# Patient Record
Sex: FEMALE | Race: WHITE | NOT HISPANIC OR LATINO | Employment: OTHER | URBAN - METROPOLITAN AREA
[De-identification: names, ages, dates, MRNs, and addresses within clinical notes are randomized per-mention and may not be internally consistent; named-entity substitution may affect disease eponyms.]

---

## 2017-03-16 ENCOUNTER — TRANSFERRED RECORDS (OUTPATIENT)
Dept: HEALTH INFORMATION MANAGEMENT | Facility: CLINIC | Age: 62
End: 2017-03-16

## 2018-11-30 ENCOUNTER — TRANSFERRED RECORDS (OUTPATIENT)
Dept: HEALTH INFORMATION MANAGEMENT | Facility: CLINIC | Age: 63
End: 2018-11-30

## 2021-04-28 ENCOUNTER — TRANSFERRED RECORDS (OUTPATIENT)
Dept: HEALTH INFORMATION MANAGEMENT | Facility: CLINIC | Age: 66
End: 2021-04-28

## 2021-05-10 ENCOUNTER — TRANSFERRED RECORDS (OUTPATIENT)
Dept: HEALTH INFORMATION MANAGEMENT | Facility: CLINIC | Age: 66
End: 2021-05-10

## 2021-05-11 ENCOUNTER — TRANSFERRED RECORDS (OUTPATIENT)
Dept: HEALTH INFORMATION MANAGEMENT | Facility: CLINIC | Age: 66
End: 2021-05-11

## 2021-05-18 ENCOUNTER — TRANSCRIBE ORDERS (OUTPATIENT)
Dept: OTHER | Age: 66
End: 2021-05-18

## 2021-05-18 ENCOUNTER — PRE VISIT (OUTPATIENT)
Dept: ONCOLOGY | Facility: CLINIC | Age: 66
End: 2021-05-18

## 2021-05-18 DIAGNOSIS — C57.4: Primary | ICD-10-CM

## 2021-05-18 NOTE — TELEPHONE ENCOUNTER
Action    Action Taken 21:    -Spoke w/ Jackie @ [facility], pt needs NINO.    -LVN for pt re: NINO; Recs Call.    -21  Records from [facility] received & sent to HIM for stat upload.  2:49 PM         RECORDS STATUS - ALL OTHER DIAGNOSIS      RECORDS RECEIVED FROM: [facility]   DATE RECEIVED:    NOTES STATUS DETAILS   OFFICE NOTE from referring provider     OFFICE NOTE from medical oncologist Received & sent to HIM for upload  [facility]     DISCHARGE SUMMARY from hospital     DISCHARGE REPORT from the ER     OPERATIVE REPORT     MEDICATION LIST     CLINICAL TRIAL TREATMENTS TO DATE     LABS     PATHOLOGY REPORTS [place], Report in CE, Slides requested per IB from [facility] 21: [id number]  3/16/17: [id number]  16: [id number]   ANYTHING RELATED TO DIAGNOSIS     GENONOMIC TESTING     TYPE:     IMAGING (NEED IMAGES & REPORT)     XR PACS [facility]   CT SCANS PACS [facility]   MRI     MAMMO     ULTRASOUND PACS [facility]   PET PACS [facility]

## 2021-05-24 ENCOUNTER — TRANSFERRED RECORDS (OUTPATIENT)
Dept: HEALTH INFORMATION MANAGEMENT | Facility: CLINIC | Age: 66
End: 2021-05-24

## 2021-05-27 ENCOUNTER — ONCOLOGY VISIT (OUTPATIENT)
Dept: ONCOLOGY | Facility: CLINIC | Age: 66
End: 2021-05-27
Attending: OBSTETRICS & GYNECOLOGY
Payer: COMMERCIAL

## 2021-05-27 VITALS
HEIGHT: 64 IN | WEIGHT: 244 LBS | TEMPERATURE: 98.8 F | BODY MASS INDEX: 41.66 KG/M2 | DIASTOLIC BLOOD PRESSURE: 81 MMHG | SYSTOLIC BLOOD PRESSURE: 170 MMHG | HEART RATE: 106 BPM | OXYGEN SATURATION: 93 %

## 2021-05-27 DIAGNOSIS — C57.4: ICD-10-CM

## 2021-05-27 PROCEDURE — G0463 HOSPITAL OUTPT CLINIC VISIT: HCPCS

## 2021-05-27 PROCEDURE — 99204 OFFICE O/P NEW MOD 45 MIN: CPT | Performed by: OBSTETRICS & GYNECOLOGY

## 2021-05-27 RX ORDER — CITALOPRAM HYDROBROMIDE 10 MG/1
10 TABLET ORAL EVERY MORNING
COMMUNITY
Start: 2021-05-21

## 2021-05-27 RX ORDER — LIDOCAINE/PRILOCAINE 2.5 %-2.5%
CREAM (GRAM) TOPICAL
COMMUNITY
Start: 2021-04-16

## 2021-05-27 RX ORDER — ERGOCALCIFEROL 1.25 MG/1
50000 CAPSULE ORAL
COMMUNITY

## 2021-05-27 RX ORDER — ALBUTEROL SULFATE 90 UG/1
2 AEROSOL, METERED RESPIRATORY (INHALATION)
COMMUNITY
Start: 2019-11-13

## 2021-05-27 RX ORDER — MULTIVITAMIN,THERAPEUTIC
1 TABLET ORAL
COMMUNITY

## 2021-05-27 RX ORDER — FLUOCINONIDE 0.5 MG/G
CREAM TOPICAL
COMMUNITY
Start: 2019-09-25

## 2021-05-27 RX ORDER — NYSTATIN 100000 U/G
CREAM TOPICAL
COMMUNITY
Start: 2021-04-13

## 2021-05-27 RX ORDER — DOCUSATE SODIUM 100 MG/1
300 CAPSULE, LIQUID FILLED ORAL
COMMUNITY

## 2021-05-27 SDOH — HEALTH STABILITY: MENTAL HEALTH: HOW MANY STANDARD DRINKS CONTAINING ALCOHOL DO YOU HAVE ON A TYPICAL DAY?: NOT ASKED

## 2021-05-27 SDOH — HEALTH STABILITY: MENTAL HEALTH: HOW OFTEN DO YOU HAVE A DRINK CONTAINING ALCOHOL?: NOT ASKED

## 2021-05-27 SDOH — HEALTH STABILITY: MENTAL HEALTH: HOW OFTEN DO YOU HAVE 6 OR MORE DRINKS ON ONE OCCASION?: NOT ASKED

## 2021-05-27 ASSESSMENT — MIFFLIN-ST. JEOR: SCORE: 1633.84

## 2021-05-27 ASSESSMENT — PAIN SCALES - GENERAL: PAINLEVEL: NO PAIN (0)

## 2021-05-27 NOTE — PROGRESS NOTES
Consult Notes on Referred Patient    Date: 2021       Dr. Marleen Young MD  No address on file       RE: Nidia Spann  : 1955  ED: 2021    Dear Dr. Referred Self:    I had the pleasure of seeing your patient Nidia Spann here at the Gynecologic Cancer Clinic at the [facility] on 2021.  As you know she is a very pleasant 66 year old woman with a recent diagnosis of  Metastatic endometrial cancer.  Given these findings she was subsequently sent to the Gynecologic Cancer Clinic for new patient consultation.     Today: Vomiting on and off, since pelvic RT has either had constipation or diarrhea. Has to be careful with what she eats. Lost 32 lbs eating vegan since 21. Has not been moving around as much as previously. Feels deconditioned. No neuropathy.    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    2016-17: 6 cycles dose dense carbo/taxol    3/16/17: XL/MICHELE/PALMER/LSO/omentectomy and abdominal wound revision  Grade 2 endometrioid endometrial cancer 6.5 cm tumor with in the uterus extending into the cervix and DOI 89%    Adjuvant WPRT and vaginal brachy 6/15/17    4/3/21: vomiting episodes began.    21: N/V 2 cm annular thickening along tranverse colon, no obsturction. LLL mass 5.7 x 5.6 x 2.5 cm. 1 cm hilar LN.    Lung Mass biopsied recurrent.    21: CT chest: .  Left lower lobe mass abutting the pleural surface medially. Small node inferior left hilum suspicious for metastatic disease.    2.  Consider metastatic disease from previous endometrial cancer versus primary bronchogenic malignancy.    21: CTPET scan-confirms metastatic  disease.      Estrogen receptor: Positive (%, strong staining)   Progesterone receptor: Positive (%, moderate staining)     Test(s) Performed:            HER2 by Immunohistochemistry:    Negative (Score 0)     LUNG, LEFT LOWER LOBE, CT-GUIDED NEEDLE BIOPSY:   1. Positive for adenocarcinoma compatible with endometrial origin   2. Hormone receptors:             Estrogen receptor: Positive (94%, strong staining)             Progesterone receptor: Positive (73%, strong staining)   3. See microscopic description and comment [facility]   Comment  The tumor is morphologically and immunohistochemically compatible with the patient's prior endometrioid adenocarcinoma of the endometrium. MSI studies were performed on the initial tumor resection and showed:     1. 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 MLH1 promoter hypermethylated         Review of Systems:    I have reviewed the pertinent positive findings in the review of symptoms with the patient.      Past Medical History:    No DM, heart disease or lung dz.    Past Surgical History:    See above.    Health Maintenance:  Health Maintenance Due   Topic Date Due     PREVENTIVE CARE VISIT  Never done     ADVANCE CARE PLANNING  Never done     MAMMO SCREENING  Never done     COLORECTAL CANCER SCREENING  Never done     HIV SCREENING  Never done     HEPATITIS C SCREENING  Never done     PAP  Never done     DTAP/TDAP/TD IMMUNIZATION (1 - Tdap) Never done     LIPID  Never done     PHQ-2  Never done     ZOSTER IMMUNIZATION (2 of 2) 05/31/2021          Last Mammogram: 5/24/21              Result: indeterminate needs further imaging.   Last Colonoscopy: Within 2 years             Result:                             Current Medications:     has a current medication list which includes the following prescription(s): albuterol, ascorbic acid, citalopram, diphenhydramine, docusate sodium, ergocalciferol,  "fluocinonide, lidocaine-prilocaine, oncovite, nystatin, and vitamin b-12.       Allergies:     Allergies   Allergen Reactions     Dog Epithelium Shortness Of Breath     Paclitaxel Other (See Comments)     Patient experienced loss of conciseness  Patient experienced loss of conciseness              Social History:     Social History     Tobacco Use     Smoking status: Not on file   Substance Use Topics     Alcohol use: Not on file       History   Drug Use Not on file           Family History:     The patient's family history is notable for the following    PGM-breast cancer  Milagro Gherigs disease mom age 83  Osteosarcoma in nephew-maternal side.  No uterine or colon or ovarian cancer.    Physical Exam:     BP (!) 170/81   Pulse 106   Temp 98.8  F (37.1  C) (Oral)   Ht 1.613 m (5' 3.5\")   Wt 110.7 kg (244 lb)   SpO2 93%   BMI 42.54 kg/m    Body mass index is 42.54 kg/m .    General Appearance: healthy and alert, no distress     HEENT:  no thyromegaly, no palpable nodules or masses        Cardiovascular: regular rate and rhythm, no gallops, rubs or murmurs     Respiratory: lungs clear, no rales, rhonchi or wheezes, normal diaphragmatic excursion    Musculoskeletal: extremities non tender and without edema    Skin: no lesions or rashes     Neurological: normal gait, no gross defects     Psychiatric: appropriate mood and affect                               Hematological: normal cervical, supraclavicular and inguinal lymph nodes     Gastrointestinal:       abdomen soft, non-tender, non-distended, no organomegaly or masses    Genitourinary: deferred      Assessment:    Nidia Spann is a 65 year old woman with a new diagnosis of recurrent endometrial cancer. Following with Dr. Heck.      Plan:     1.)    Discussed T/C again vs. Pembrolizumab vs. Dostarlimab now based on the fact she has   1. 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 MLH1 promoter hypermethylated    2.) Caris tumor " testing    -consider hormonal therapy as treatment as well due to ER/TN following chemotherapy.          Thank you for allowing us to participate in the care of your patient.         Sincerely,    Patricia Romeo MD    Department of Ob/Gyn and Women's Health  Division of Gynecologic Oncology  [facility]  [phone]        Patient Care Team:  Valerie De Jesus as PCP (Family Medicine)  Aylin Heck MD as MD (Gynecologic Oncology)  Patricia Romeo MD as MD (Gynecologic Oncology)  SELF, REFERRED

## 2021-05-27 NOTE — NURSING NOTE
"Oncology Rooming Note    May 27, 2021 1:20 PM   Nidia Spann is a 65 year old female who presents for:    Chief Complaint   Patient presents with     Oncology Clinic Visit     malignant neoplasm of uterine adnexa     Initial Vitals: BP (!) 170/81   Pulse 106   Temp 98.8  F (37.1  C) (Oral)   Ht 1.613 m (5' 3.5\")   Wt 110.7 kg (244 lb)   SpO2 93%   BMI 42.54 kg/m   Estimated body mass index is 42.54 kg/m  as calculated from the following:    Height as of this encounter: 1.613 m (5' 3.5\").    Weight as of this encounter: 110.7 kg (244 lb). Body surface area is 2.23 meters squared.  No Pain (0) Comment: Data Unavailable   No LMP recorded. Patient has had a hysterectomy.  Allergies reviewed: Yes  Medications reviewed: Yes    Medications: Medication refills not needed today.  Pharmacy name entered into [facility]: [id number] [address] AT [facility] [address] [place]    Clinical concerns: none       Debra Brerios CMA            "

## 2021-05-27 NOTE — LETTER
2021         RE: Nidia Spann  [address] [place] [address]        Dear Colleague,    Thank you for referring your patient, Nidia Spann, to the [facility]. Please see a copy of my visit note below.                            Consult Notes on Referred Patient    Date: 2021       Dr. Marleen Young MD  No address on file       RE: Nidia Spann  : 1955  ED: 2021    Dear Dr. Referred Self:    I had the pleasure of seeing your patient Nidia Spann here at the Gynecologic Cancer Clinic at the [facility] on 2021.  As you know she is a very pleasant 66 year old woman with a recent diagnosis of  Metastatic endometrial cancer.  Given these findings she was subsequently sent to the Gynecologic Cancer Clinic for new patient consultation.     Today: Vomiting on and off, since pelvic RT has either had constipation or diarrhea. Has to be careful with what she eats. Lost 32 lbs eating vegan since 21. Has not been moving around as much as previously. Feels deconditioned. No neuropathy.    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    2016-17: 6 cycles dose dense carbo/taxol    3/16/17: XL/MICHELE/PALMER/LSO/omentectomy and abdominal wound revision  Grade 2 endometrioid endometrial cancer 6.5 cm tumor with in the uterus extending into the cervix and DOI 89%    Adjuvant WPRT and vaginal brachy 6/15/17    4/3/21: vomiting episodes began.    21: N/V 2 cm annular thickening along tranverse colon, no obsturction. LLL mass 5.7 x 5.6 x 2.5 cm. 1 cm hilar LN.    Lung Mass biopsied recurrent.    21: CT chest: .  Left lower lobe mass  abutting the pleural surface medially. Small node inferior left hilum suspicious for metastatic disease.    2.  Consider metastatic disease from previous endometrial cancer versus primary bronchogenic malignancy.    5/11/21: CTPET scan-confirms metastatic disease.      Estrogen receptor: Positive (%, strong staining)   Progesterone receptor: Positive (%, moderate staining)     Test(s) Performed:            HER2 by Immunohistochemistry:    Negative (Score 0)     LUNG, LEFT LOWER LOBE, CT-GUIDED NEEDLE BIOPSY:   1. Positive for adenocarcinoma compatible with endometrial origin   2. Hormone receptors:             Estrogen receptor: Positive (94%, strong staining)             Progesterone receptor: Positive (73%, strong staining)   3. See microscopic description and comment [facility]   Comment  The tumor is morphologically and immunohistochemically compatible with the patient's prior endometrioid adenocarcinoma of the endometrium. MSI studies were performed on the initial tumor resection and showed:     1. 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 MLH1 promoter hypermethylated         Review of Systems:    I have reviewed the pertinent positive findings in the review of symptoms with the patient.      Past Medical History:    No DM, heart disease or lung dz.    Past Surgical History:    See above.    Health Maintenance:  Health Maintenance Due   Topic Date Due     PREVENTIVE CARE VISIT  Never done     ADVANCE CARE PLANNING  Never done     MAMMO SCREENING  Never done     COLORECTAL CANCER SCREENING  Never done     HIV SCREENING  Never done     HEPATITIS C SCREENING  Never done     PAP  Never done     DTAP/TDAP/TD IMMUNIZATION (1 - Tdap) Never done     LIPID  Never done     PHQ-2  Never done     ZOSTER IMMUNIZATION (2 of 2) 05/31/2021          Last Mammogram: 5/24/21              Result: indeterminate needs further imaging.   Last  "Colonoscopy: Within 2 years             Result:                             Current Medications:     has a current medication list which includes the following prescription(s): albuterol, ascorbic acid, citalopram, diphenhydramine, docusate sodium, ergocalciferol, fluocinonide, lidocaine-prilocaine, oncovite, nystatin, and vitamin b-12.       Allergies:     Allergies   Allergen Reactions     Dog Epithelium Shortness Of Breath     Paclitaxel Other (See Comments)     Patient experienced loss of conciseness  Patient experienced loss of conciseness              Social History:     Social History     Tobacco Use     Smoking status: Not on file   Substance Use Topics     Alcohol use: Not on file       History   Drug Use Not on file           Family History:     The patient's family history is notable for the following    PGM-breast cancer  Milagro Gherigs disease mom age 83  Osteosarcoma in nephew-maternal side.  No uterine or colon or ovarian cancer.    Physical Exam:     BP (!) 170/81   Pulse 106   Temp 98.8  F (37.1  C) (Oral)   Ht 1.613 m (5' 3.5\")   Wt 110.7 kg (244 lb)   SpO2 93%   BMI 42.54 kg/m    Body mass index is 42.54 kg/m .    General Appearance: healthy and alert, no distress     HEENT:  no thyromegaly, no palpable nodules or masses        Cardiovascular: regular rate and rhythm, no gallops, rubs or murmurs     Respiratory: lungs clear, no rales, rhonchi or wheezes, normal diaphragmatic excursion    Musculoskeletal: extremities non tender and without edema    Skin: no lesions or rashes     Neurological: normal gait, no gross defects     Psychiatric: appropriate mood and affect                               Hematological: normal cervical, supraclavicular and inguinal lymph nodes     Gastrointestinal:       abdomen soft, non-tender, non-distended, no organomegaly or masses    Genitourinary: deferred      Assessment:    Nidia Spann is a 65 year old woman with a new diagnosis of recurrent endometrial " cancer. Following with Dr. Heck.      Plan:     1.)    Discussed T/C again vs. Pembrolizumab vs. Dostarlimab now based on the fact she has   1. 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 MLH1 promoter hypermethylated    2.) Caris tumor testing    -consider hormonal therapy as treatment as well due to ER/SD following chemotherapy.          Thank you for allowing us to participate in the care of your patient.         Sincerely,    Patricia Romeo MD    Department of Ob/Gyn and Women's Health  Division of Gynecologic Oncology  [facility]  [phone]      CC  Patient Care Team:  Valerie De Jesus as PCP (Family Medicine)  Aylin Heck MD as MD (Gynecologic Oncology)

## 2021-08-21 ENCOUNTER — HEALTH MAINTENANCE LETTER (OUTPATIENT)
Age: 66
End: 2021-08-21

## 2021-10-16 ENCOUNTER — HEALTH MAINTENANCE LETTER (OUTPATIENT)
Age: 66
End: 2021-10-16

## 2022-10-01 ENCOUNTER — HEALTH MAINTENANCE LETTER (OUTPATIENT)
Age: 67
End: 2022-10-01

## 2023-08-06 ENCOUNTER — HEALTH MAINTENANCE LETTER (OUTPATIENT)
Age: 68
End: 2023-08-06

## 2023-10-15 ENCOUNTER — HEALTH MAINTENANCE LETTER (OUTPATIENT)
Age: 68
End: 2023-10-15